# Patient Record
Sex: MALE | Race: WHITE | NOT HISPANIC OR LATINO
[De-identification: names, ages, dates, MRNs, and addresses within clinical notes are randomized per-mention and may not be internally consistent; named-entity substitution may affect disease eponyms.]

---

## 2022-06-08 PROBLEM — Z00.00 ENCOUNTER FOR PREVENTIVE HEALTH EXAMINATION: Status: ACTIVE | Noted: 2022-06-08

## 2022-06-09 ENCOUNTER — APPOINTMENT (OUTPATIENT)
Dept: SURGERY | Facility: CLINIC | Age: 67
End: 2022-06-09
Payer: COMMERCIAL

## 2022-06-09 VITALS — BODY MASS INDEX: 33.04 KG/M2 | WEIGHT: 236 LBS | HEIGHT: 71 IN

## 2022-06-09 PROCEDURE — 99203 OFFICE O/P NEW LOW 30 MIN: CPT

## 2022-06-09 NOTE — ASSESSMENT
[FreeTextEntry1] : Rock is a pleasant 67-year-old retired gentleman with no significant past medical history other than a spinal cord injury in 2007 status post anterior cervical discectomy and fixation who presents to the office with pain and swelling in the right groin suspicious for hernia.  He was seen in the emergency room last month that [facility] and recommended to seek surgical evaluation.\par \par Physical examination demonstrates a large tender eggplant sized bulge in the right groin extending deep into his right hemiscrotum which is not reducible consistent with an incarcerated right inguinal scrotal hernia warranting surgical repair.  There is no evidence of strangulation and the patient denies any symptoms of obstruction.  Examination of his left groin demonstrates a moderate weakness but no obvious hernia.  Both testicles appear normal, although he may have bilateral hydroceles, small on the left and possibly larger on the right (which will be further evaluated intraoperatively).  His umbilical examination demonstrates a tiny asymptomatic nonprotruding umbilical hernia which is of no significant clinical concern.  His current BMI is 33.\par \par I explained the pros and cons of surgery, as well as all risks, benefits, indications and alternatives of the procedure and the patient understood and agreed.  Rock was scheduled for the repair of his incarcerated right inguinal scrotal hernia with mesh on Wednesday, July 20, 2022 under LOCAL with IV SEDATION at [facility] with presurgical testing waived.  He was encouraged to avoid heavy lifting and strenuous activity in the interim, of course.  We also discussed the importance of calorie restriction and healthy eating with regard to weight loss, hernia recurrence and his overall health.

## 2022-06-09 NOTE — PHYSICAL EXAM
[JVD] : no jugular venous distention  [Normal Breath Sounds] : Normal breath sounds [No Rash or Lesion] : No rash or lesion [Alert] : alert [Calm] : calm [de-identified] : Overweight [de-identified] : Normal [de-identified] : Moderately protuberant abdomen [de-identified] : Normal testicles, possible bilateral hydroceles [de-identified] : Large incarcerated right inguinal scrotal hernia, tiny asymptomatic umbilical hernia

## 2022-06-09 NOTE — CONSULT LETTER
[Dear  ___] : Dear  [unfilled], [Courtesy Letter:] : I had the pleasure of seeing your patient, [unfilled], in my office today. [Please see my note below.] : Please see my note below. [Consult Closing:] : Thank you very much for allowing me to participate in the care of this patient.  If you have any questions, please do not hesitate to contact me. [FreeTextEntry3] : Respectfully,\par \par Ric Gutiérrez M.D., FACS\par

## 2022-07-17 ENCOUNTER — LABORATORY RESULT (OUTPATIENT)
Age: 67
End: 2022-07-17

## 2022-07-19 NOTE — ASU PATIENT PROFILE, ADULT - FALL HARM RISK - UNIVERSAL INTERVENTIONS
Bed in lowest position, wheels locked, appropriate side rails in place/Call bell, personal items and telephone in reach/Instruct patient to call for assistance before getting out of bed or chair/Non-slip footwear when patient is out of bed/[place] to call system/Physically safe environment - no spills, clutter or unnecessary equipment/Purposeful Proactive Rounding/Room/bathroom lighting operational, light cord in reach

## 2022-07-19 NOTE — ASU PATIENT PROFILE, ADULT - PATIENT REPRESENTATIVE NAME
OUTSIDE TESTING RESULT REQUEST     IMPORTANT: FOR YOUR IMMEDIATE ATTENTION  Please FAX all test results listed below to: [phone]     Testing already done on or about:       * * * * If testing is NOT complete, arrange with patient A.S.A.P. * * * *      Patient Name: Leonel Lloyd  Surgery Date: 2025  Medical Record: [id number]  CSN: [id number]  : 1956 - A: 69 y     Sex: male  Surgeon(s):  Cornell Rcihards MD  Procedure: XI ROBOT-ASSISTED LAPAROSCOPIC LEFT ADRENALECTOMY  Anesthesia Type: General     Surgeon: Cornell Richards MD     The following Testing and Time Line are REQUIRED PER ANESTHESIA     EKG READ AND SIGNED WITHIN   90 days  BMP (requires 4 hour fast) within  90 days      Thank You,   Sent by:RADHA RODRIGUEZ    
wife

## 2022-07-20 ENCOUNTER — OUTPATIENT (OUTPATIENT)
Dept: OUTPATIENT SERVICES | Facility: HOSPITAL | Age: 67
LOS: 1 days | Discharge: HOME | End: 2022-07-20

## 2022-07-20 ENCOUNTER — TRANSCRIPTION ENCOUNTER (OUTPATIENT)
Age: 67
End: 2022-07-20

## 2022-07-20 ENCOUNTER — APPOINTMENT (OUTPATIENT)
Dept: SURGERY | Facility: AMBULATORY SURGERY CENTER | Age: 67
End: 2022-07-20

## 2022-07-20 ENCOUNTER — RESULT REVIEW (OUTPATIENT)
Age: 67
End: 2022-07-20

## 2022-07-20 VITALS
OXYGEN SATURATION: 97 % | HEART RATE: 73 BPM | DIASTOLIC BLOOD PRESSURE: 73 MMHG | SYSTOLIC BLOOD PRESSURE: 134 MMHG | WEIGHT: 237.66 LBS | HEIGHT: 71 IN | RESPIRATION RATE: 18 BRPM | TEMPERATURE: 98 F

## 2022-07-20 VITALS
SYSTOLIC BLOOD PRESSURE: 116 MMHG | HEART RATE: 54 BPM | RESPIRATION RATE: 17 BRPM | DIASTOLIC BLOOD PRESSURE: 61 MMHG | OXYGEN SATURATION: 98 %

## 2022-07-20 DIAGNOSIS — Z98.890 OTHER SPECIFIED POSTPROCEDURAL STATES: Chronic | ICD-10-CM

## 2022-07-20 PROCEDURE — 55520 REMOVAL OF SPERM CORD LESION: CPT | Mod: RT,XS

## 2022-07-20 PROCEDURE — 88304 TISSUE EXAM BY PATHOLOGIST: CPT | Mod: 26

## 2022-07-20 PROCEDURE — 49507 PRP I/HERN INIT BLOCK >5 YR: CPT | Mod: RT

## 2022-07-20 RX ORDER — TRAMADOL HYDROCHLORIDE 50 MG/1
1 TABLET ORAL
Qty: 24 | Refills: 0
Start: 2022-07-20 | End: 2022-07-23

## 2022-07-20 NOTE — ASU DISCHARGE PLAN (ADULT/PEDIATRIC) - CARE PROVIDER_API CALL
Ric Gutiérrez)  Surgery  [address]  [place] [address]  Phone: [phone]  Fax: [phone]  Scheduled Appointment: 08/01/2022

## 2022-07-20 NOTE — BRIEF OPERATIVE NOTE - NSICDXBRIEFPROCEDURE_GEN_ALL_CORE_FT
PROCEDURES:  Open repair of incarcerated inguinal hernia using mesh in adult 20-Jul-2022 09:09:12 RIGHT Ric Gutiérrez

## 2022-07-20 NOTE — ASU DISCHARGE PLAN (ADULT/PEDIATRIC) - NS MD DC FALL RISK RISK
For information on Fall & Injury Prevention, visit: [email or web address] OR  [email or web address] OR  https://www.cdc.gov/steadi/patient.html

## 2022-07-22 LAB — SURGICAL PATHOLOGY STUDY: SIGNIFICANT CHANGE UP

## 2022-07-25 DIAGNOSIS — K40.30 UNILATERAL INGUINAL HERNIA, WITH OBSTRUCTION, WITHOUT GANGRENE, NOT SPECIFIED AS RECURRENT: ICD-10-CM

## 2022-08-01 ENCOUNTER — APPOINTMENT (OUTPATIENT)
Dept: SURGERY | Facility: CLINIC | Age: 67
End: 2022-08-01

## 2022-08-01 DIAGNOSIS — K40.30 UNILATERAL INGUINAL HERNIA, WITH OBSTRUCTION, W/OUT GANGRENE, NOT SPECIFIED AS RECURRENT: ICD-10-CM

## 2022-08-01 PROCEDURE — 99024 POSTOP FOLLOW-UP VISIT: CPT

## 2022-08-01 NOTE — ASSESSMENT
[FreeTextEntry1] : JOSE LALA underwent a very large incarcerated indirect and a large incarcerated direct right inguinal scrotal hernia repair with mesh with Dr. Gutiérrez on 7/20/22 under local IV sedation without any problems or complications. His wound is clean, dry and intact. There is no evidence of erythema, seroma formation or infection. He is tolerating a diet and having normal bowel movements. He denies any significant postoperative pain or discomfort at this time.\par \par He was counseled and reassured. JOSE was discharged from the office with no specific follow up necessary, but he knows to avoid any heavy lifting or strenuous activity for the next several weeks. \par \par \par \par

## 2022-08-01 NOTE — CONSULT LETTER
[Dear  ___] : Dear  [unfilled], [Courtesy Letter:] : I had the pleasure of seeing your patient, [unfilled], in my office today. [Please see my note below.] : Please see my note below. [Consult Closing:] : Thank you very much for allowing me to participate in the care of this patient.  If you have any questions, please do not hesitate to contact me. [FreeTextEntry3] : \par Sincerely,\par \par Sangeeta Darling PA-C, TATYANA\par